# Patient Record
Sex: FEMALE | Race: WHITE | NOT HISPANIC OR LATINO | Employment: FULL TIME | URBAN - METROPOLITAN AREA
[De-identification: names, ages, dates, MRNs, and addresses within clinical notes are randomized per-mention and may not be internally consistent; named-entity substitution may affect disease eponyms.]

---

## 2020-08-24 ENCOUNTER — NON-PROVIDER VISIT (OUTPATIENT)
Dept: URGENT CARE | Facility: PHYSICIAN GROUP | Age: 25
End: 2020-08-24

## 2020-08-24 DIAGNOSIS — Z11.1 PPD SCREENING TEST: ICD-10-CM

## 2020-08-24 PROCEDURE — 86580 TB INTRADERMAL TEST: CPT | Performed by: PHYSICIAN ASSISTANT

## 2020-08-25 NOTE — NON-PROVIDER
Candice Courser is a 25 y.o. female here for a non-provider visit for PPD placement -- Step 1 of 1    Reason for PPD:  work requirement    1. TB evaluation questionnaire completed by patient? Yes      -  If any answers marked yes did you contact a provider prior to placing? Not Indicated  2.  Patient notified to return to clinic for reading on: 08/26/2020  3.  PPD Placement documentation completed on TB evaluation questionnaire? Yes  4.  Location of TB evaluation questionnaire filed: [facility]

## 2020-08-26 ENCOUNTER — NON-PROVIDER VISIT (OUTPATIENT)
Dept: URGENT CARE | Facility: PHYSICIAN GROUP | Age: 25
End: 2020-08-26

## 2020-08-26 LAB — TB WHEAL 3D P 5 TU DIAM: NORMAL MM

## 2020-08-27 NOTE — NON-PROVIDER
Candice Courser is a 25 y.o. female here for a non-provider visit for PPD reading -- Step 2 of 2.      1.  Resulted in Epic under enter/edit results? Yes   2.  TB evaluation questionnaire scanned into chart and original given to patient?Yes      3. Was induration greater than 0 mm? No.    If Step 1 of 2, when is patient returning for second step (delete if N/A): n/a    Routed to PCP? No